# Patient Record
Sex: FEMALE | NOT HISPANIC OR LATINO | URBAN - METROPOLITAN AREA
[De-identification: names, ages, dates, MRNs, and addresses within clinical notes are randomized per-mention and may not be internally consistent; named-entity substitution may affect disease eponyms.]

---

## 2019-03-05 ENCOUNTER — APPOINTMENT (RX ONLY)
Dept: URBAN - METROPOLITAN AREA CLINIC 12 | Facility: CLINIC | Age: 34
Setting detail: DERMATOLOGY
End: 2019-03-05

## 2019-03-05 DIAGNOSIS — Z41.1 ENCOUNTER FOR COSMETIC SURGERY: ICD-10-CM

## 2019-03-05 PROCEDURE — ? CONSULTATION - BREAST (COSMETIC)

## 2019-03-05 PROCEDURE — ? PATIENT SPECIFIC COUNSELING

## 2019-03-05 NOTE — PROCEDURE: PATIENT SPECIFIC COUNSELING
Other (Free Text): Recommend silicone gel implants to reduce risk of rippling. Informed patient that ~90% of patients choose silicone. Informed pt that the highly cohesive silicone implant would be best but she will likely still have some rippling given her thin figure and lack of breast tissue. \\n\\nAdvised it is up to her whether to go over or under the muscle. Going under the muscle has tendency to push the implant down and out over time, but over the muscle has increased risk of rippling. Recommended patient choose under the muscle which will mean she has to avoid/decrease chest weight lifting moves and push ups, but will have better results.  \\n\\nMeasurements\\n-Nipple to sternal notch: 19.5 both \\n-Width: 11.5 both \\n-Areola: 0.5 both\\n-Nipple to inframammary fold: 5 right, 4.5 left\\n\\nAfter sizing and review of before/afters, patient believes she’d like to be around 300cc.
Detail Level: Zone

## 2019-03-05 NOTE — HPI: BREAST (AESTHETIC DEFORMITY, BREAST)
Weight Measured In Kgs Or Lbs?: lbs
Are You Contemplating Future Pregnancy?: No
Which Breast(S) Are You Concerned About?: bilateral breasts
Which Side(S)?: both breasts
How Severe Are Your Concerns?: moderate
Is This A New Presentation, Or A Follow-Up?: Breast (Aesthetic Breast Deformity)
Referral Source: Ariadna Duran
What Is Your Current Weight?: 129
How Much Weight Have You Gained?: 7
Over What Period Of Time?: 1 year
How Many Pregnancies Have You Had (Female Patients)?: 4
How Many Live Born Children Have You Had (Female Patients)?: 3
Additional History: Patient enjoys weight lifting and staying active. Patient admits to problems with decreased BP with epidurals in the past.